# Patient Record
Sex: FEMALE | Race: OTHER | URBAN - METROPOLITAN AREA
[De-identification: names, ages, dates, MRNs, and addresses within clinical notes are randomized per-mention and may not be internally consistent; named-entity substitution may affect disease eponyms.]

---

## 2020-02-03 ENCOUNTER — TELEPHONE (OUTPATIENT)
Dept: INTERNAL MEDICINE CLINIC | Facility: CLINIC | Age: 60
End: 2020-02-03

## 2020-02-03 NOTE — TELEPHONE ENCOUNTER
Reason for Call/Symptoms: Daughter stating mom is having \"blood pressure issues and shortness of breath. \"  Onset: Not clear as to how long mom has been having symptoms  Courtesy Assessment: Daughter calling for mom requesting appointment with her \"prima

## 2025-01-13 ENCOUNTER — OFFICE VISIT (OUTPATIENT)
Facility: LOCATION | Age: 65
End: 2025-01-13

## 2025-01-13 VITALS
OXYGEN SATURATION: 97 % | WEIGHT: 167.81 LBS | RESPIRATION RATE: 18 BRPM | SYSTOLIC BLOOD PRESSURE: 147 MMHG | HEART RATE: 85 BPM | DIASTOLIC BLOOD PRESSURE: 89 MMHG

## 2025-01-13 DIAGNOSIS — N39.0 URINARY TRACT INFECTION WITH HEMATURIA, SITE UNSPECIFIED: Primary | ICD-10-CM

## 2025-01-13 DIAGNOSIS — R31.9 URINARY TRACT INFECTION WITH HEMATURIA, SITE UNSPECIFIED: Primary | ICD-10-CM

## 2025-01-13 DIAGNOSIS — R30.0 DYSURIA: ICD-10-CM

## 2025-01-13 LAB
BILIRUBIN: NEGATIVE
GLUCOSE (URINE DIPSTICK): NEGATIVE MG/DL
KETONES (URINE DIPSTICK): NEGATIVE MG/DL
MULTISTIX LOT#: ABNORMAL NUMERIC
NITRITE, URINE: NEGATIVE
PH, URINE: 5.5 (ref 4.5–8)
PROTEIN (URINE DIPSTICK): NEGATIVE MG/DL
SPECIFIC GRAVITY: 1.02 (ref 1–1.03)
URINE-COLOR: YELLOW
UROBILINOGEN,SEMI-QN: 0.2 MG/DL (ref 0–1.9)

## 2025-01-13 PROCEDURE — 99203 OFFICE O/P NEW LOW 30 MIN: CPT | Performed by: NURSE PRACTITIONER

## 2025-01-13 PROCEDURE — 81002 URINALYSIS NONAUTO W/O SCOPE: CPT | Performed by: NURSE PRACTITIONER

## 2025-01-13 RX ORDER — SULFAMETHOXAZOLE AND TRIMETHOPRIM 800; 160 MG/1; MG/1
1 TABLET ORAL 2 TIMES DAILY
Qty: 14 TABLET | Refills: 0 | Status: SHIPPED | OUTPATIENT
Start: 2025-01-13 | End: 2025-01-20

## 2025-01-13 NOTE — PROGRESS NOTES
INTERNAL MEDICINE OFFICE NOTE     Patient ID: Hilary Fall is a 65 year old female.  Today's Date: 01/13/25  Chief Complaint: Urinary Frequency (Symptoms started 1 week ago /Frequent urination possible UTI /Taking AZO over the counter 3-4 days /Has pain in back )    Urinary Frequency   Associated symptoms include flank pain, frequency, hematuria and urgency. Pertinent negatives include no chills, nausea or vomiting.     Hilary Fall is a 65 year old female who presents with symptoms of UTI. Complaining of urinary frequency, urgency, dysuria for last 7 days. She has had UTI in past and this feels similar but worsening symptoms. Has some lower left back pain.  Denies fever, hematuria, nausea, or vomiting.  Denies abdominal pain, pelvic pain, vaginal discharge, bleeding, itching. Tolerates PO well at home. No n/v/d. Has tried otc Azo at home. Denies any other aggravating or relieving factors at home. Denies any other treatment attempts prior to arrival.      Vitals:    01/13/25 0943   BP: 147/89   Pulse: 85   Resp: 18   SpO2: 97%   Weight: 167 lb 12.8 oz (76.1 kg)     body mass index is unknown because there is no height or weight on file.  BP Readings from Last 3 Encounters:   01/13/25 147/89     The ASCVD Risk score (Monica ESQUIVEL, et al., 2019) failed to calculate for the following reasons:    Cannot find a previous HDL lab    Cannot find a previous total cholesterol lab  Medications reviewed:  Current Outpatient Medications   Medication Sig Dispense Refill    sulfamethoxazole-trimethoprim -160 MG Oral Tab per tablet Take 1 tablet by mouth 2 (two) times daily for 7 days. 14 tablet 0         Assessment & Plan    1. Urinary tract infection with hematuria, site unspecified (Primary)  -     Sulfamethoxazole-Trimethoprim; Take 1 tablet by mouth 2 (two) times daily for 7 days.  Dispense: 14 tablet; Refill: 0  2. Dysuria  -     POC Urinalysis, Manual Dip without microscopy [[id number]]  -     Urine Culture,  Routine; Future; Expected date: 01/13/2025  -     Urine Culture, Routine      Plan  Urine dip: + blood + leuks   Urine culture sent to lab.    Discussed HPI and physical exam with pt. Pt has a reassuring physical exam consistent with a lower uti. Treatment options discussed with patient and explained in detail. Patient has some lower left back pain, no vomiting, nausea, or chills or fevers. Will start bactrim today, any changes to medication pending urine culture results. The risks, benefits and potential side effects of the medications were reviewed. Alternatives were discussed. Monitoring parameters and expected course outlined. We discussed signs and symptoms that should prompt her to go to the emergency department immediately for evaluation including any fevers, flank pain, abdominal pain, vomiting, vaginal bleeding or if she has any concerns. Patient to call PCP or go to emergency department if symptoms fail to respond as outlined, or worsen in any way. The patient agreed with the plan.       Follow Up: Return for AS NEEDED/IF SYMPTOMS WORSEN..         Objective: Results:   Physical Exam  Constitutional:       Appearance: Normal appearance. She is not toxic-appearing.   HENT:      Head: Normocephalic and atraumatic.   Cardiovascular:      Rate and Rhythm: Normal rate and regular rhythm.      Heart sounds: Normal heart sounds.   Pulmonary:      Effort: Pulmonary effort is normal.      Breath sounds: Normal breath sounds.   Abdominal:      General: Abdomen is flat. Bowel sounds are normal.      Palpations: Abdomen is soft.      Tenderness: There is no abdominal tenderness. There is left CVA tenderness. There is no right CVA tenderness or guarding.   Skin:     General: Skin is warm and dry.   Neurological:      Mental Status: She is alert.        Reviewed:    There are no active problems to display for this patient.     Allergies[1]     Social History     Socioeconomic History    Marital status:    Tobacco  Use    Smoking status: Never    Smokeless tobacco: Never   Vaping Use    Vaping status: Never Used   Substance and Sexual Activity    Alcohol use: Not Currently    Drug use: Never     Social Drivers of Health     Food Insecurity: No Food Insecurity (1/13/2025)    NCSS - Food Insecurity     Worried About Running Out of Food in the Last Year: No     Ran Out of Food in the Last Year: No   Transportation Needs: No Transportation Needs (1/13/2025)    NCSS - Transportation     Lack of Transportation: No   Housing Stability: Not At Risk (1/13/2025)    NCSS - Housing/Utilities     Has Housing: Yes     Worried About Losing Housing: No     Unable to Get Utilities: No      Review of Systems   Constitutional:  Negative for chills and fever.   Respiratory: Negative.     Cardiovascular: Negative.    Gastrointestinal:  Negative for nausea and vomiting.   Genitourinary:  Positive for dysuria, flank pain, frequency, hematuria and urgency.   Skin: Negative.    Neurological: Negative.      All other systems negative unless otherwise stated in ROS or HPI above.       FARIDA Bond  Internal Medicine       Call office with any questions or seek emergency care if necessary.   Patient understands and agrees to follow directions and advice.      ----------------------------------------- PATIENT INSTRUCTIONS-----------------------------------------   .    Patient Instructions   Whenever on antibiotics: Recommend PROBIOTICS supplement  OTC (such as Florastor or Culturelle), to restore normal bacteria balance, prevent yeast infection and GI effects/diarrhea from antibiotic therapy.  Take  DURING COURSE OF ANTIBIOTICS AND for the FOLLOWING 2 WEEKS.  (Best to take at separate time than antibiotic, at least 3-4 hours before or after).  Or may eat YOGURT (Activia, Danactive, Yo-Plus) daily during and after antibiotic therapy.       Take Bacrim antibiotic as prescribed, this medication can be taken with food to avoid stomach upset.  CRANBERRY  JUICE or CRANBERRY TABLETS can help if not irritating to stomach.  Increase fluid intake.  Enough to urinate at least every 2 hours.  Avoid alcohol and caffeine.  These are bladder irritants.  Do not postpone urinating and avoid a full bladder.    Urinate after intercourse, cycling, swimming, hot tub/bath tub use and avoid rear entry.  Avoid tight pants and thongs.    Change undergarments daily or when soiled/sweaty.  Only use water to clean perineum area.      Please sign up for mychart to receive lab results and messages.             [1] No Known Allergies

## 2025-01-13 NOTE — PATIENT INSTRUCTIONS
Whenever on antibiotics: Recommend PROBIOTICS supplement  OTC (such as Florastor or Culturelle), to restore normal bacteria balance, prevent yeast infection and GI effects/diarrhea from antibiotic therapy.  Take  DURING COURSE OF ANTIBIOTICS AND for the FOLLOWING 2 WEEKS.  (Best to take at separate time than antibiotic, at least 3-4 hours before or after).  Or may eat YOGURT (Activia, Danactive, Yo-Plus) daily during and after antibiotic therapy.       Take Bacrim antibiotic as prescribed, this medication can be taken with food to avoid stomach upset.  CRANBERRY JUICE or CRANBERRY TABLETS can help if not irritating to stomach.  Increase fluid intake.  Enough to urinate at least every 2 hours.  Avoid alcohol and caffeine.  These are bladder irritants.  Do not postpone urinating and avoid a full bladder.    Urinate after intercourse, cycling, swimming, hot tub/bath tub use and avoid rear entry.  Avoid tight pants and thongs.    Change undergarments daily or when soiled/sweaty.  Only use water to clean perineum area.      Please sign up for [facility] to receive lab results and messages.

## 2025-01-15 ENCOUNTER — TELEPHONE (OUTPATIENT)
Facility: LOCATION | Age: 65
End: 2025-01-15

## 2025-01-15 DIAGNOSIS — R31.9 URINARY TRACT INFECTION WITH HEMATURIA, SITE UNSPECIFIED: ICD-10-CM

## 2025-01-15 DIAGNOSIS — N39.0 URINARY TRACT INFECTION WITH HEMATURIA, SITE UNSPECIFIED: Primary | ICD-10-CM

## 2025-01-15 DIAGNOSIS — N39.0 URINARY TRACT INFECTION WITH HEMATURIA, SITE UNSPECIFIED: ICD-10-CM

## 2025-01-15 DIAGNOSIS — R31.9 URINARY TRACT INFECTION WITH HEMATURIA, SITE UNSPECIFIED: Primary | ICD-10-CM

## 2025-01-15 RX ORDER — CEPHALEXIN 500 MG/1
500 CAPSULE ORAL EVERY 12 HOURS
Qty: 20 CAPSULE | Refills: 0 | Status: SHIPPED | OUTPATIENT
Start: 2025-01-15 | End: 2025-01-25